# Patient Record
Sex: FEMALE | Race: BLACK OR AFRICAN AMERICAN
[De-identification: names, ages, dates, MRNs, and addresses within clinical notes are randomized per-mention and may not be internally consistent; named-entity substitution may affect disease eponyms.]

---

## 2020-02-21 ENCOUNTER — HOSPITAL ENCOUNTER (EMERGENCY)
Dept: HOSPITAL 26 - MED | Age: 55
Discharge: HOME | End: 2020-02-21
Payer: COMMERCIAL

## 2020-02-21 VITALS — WEIGHT: 202 LBS | HEIGHT: 67 IN | BODY MASS INDEX: 31.71 KG/M2

## 2020-02-21 VITALS — DIASTOLIC BLOOD PRESSURE: 89 MMHG | SYSTOLIC BLOOD PRESSURE: 153 MMHG

## 2020-02-21 VITALS — SYSTOLIC BLOOD PRESSURE: 153 MMHG | DIASTOLIC BLOOD PRESSURE: 89 MMHG

## 2020-02-21 DIAGNOSIS — R03.0: ICD-10-CM

## 2020-02-21 DIAGNOSIS — J11.1: Primary | ICD-10-CM

## 2020-02-21 NOTE — NUR
PT 53 Y/O FEMALE BIB SELF FOR C/O COUGH X 1 WEEK. AFEBRILE 100.7. GENERLALIZED 
WEAKNESS. PT HAS C/O NAUSEA AND DIARRHEA X 1 DAY. PT ADMITS TO TAKING TYLENOL @ 
1900 BEFORE GOING TO BED FOR FEVER WITH INEFFECTIVE RESULTS. PT NOTED WITH 
NON-PRODUCTIVE COUGH. LUNG SOUNDS CLEAR A/P BILAT. PT RESPIRATIONS ARE EVEN AND 
UNLABORED. BS ACTIVE X 4. PT SOMACH IS ROUND, SOFT, AND NON-TENDER. COOLING 
MEASURES IN PLACE. PT DENIES PAIN AT THIS TIME. BED LOCKED AND IN LOWEST 
POSTION. 



MEDHX: MULTIPLE SCLEROSIS

ALLERGIES: NKA

## 2020-02-21 NOTE — NUR
Patient discharged with v/s stable. Written and verbal after care instructions 
given and explained. 

Patient alert, oriented and verbalized understanding of instructions. 
Ambulatory with steady gait. All questions addressed prior to discharge. ID 
band removed. Patient advised to follow up with PMD. Rx of MOTRIN, 
PROMETHAZINE, ALBUTEROL, TAMIFLU.  given. Patient educated on indication of 
medication including possible reaction and side effects. Opportunity to ask 
questions provided and answered.